# Patient Record
Sex: MALE | Race: WHITE | NOT HISPANIC OR LATINO | Employment: UNEMPLOYED | URBAN - METROPOLITAN AREA
[De-identification: names, ages, dates, MRNs, and addresses within clinical notes are randomized per-mention and may not be internally consistent; named-entity substitution may affect disease eponyms.]

---
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
## 2020-11-25 ENCOUNTER — OFFICE VISIT (OUTPATIENT)
Dept: URGENT CARE | Facility: CLINIC | Age: 20
End: 2020-11-25
Payer: COMMERCIAL

## 2020-11-25 VITALS
TEMPERATURE: 98 F | HEART RATE: 81 BPM | RESPIRATION RATE: 16 BRPM | BODY MASS INDEX: 17.07 KG/M2 | DIASTOLIC BLOOD PRESSURE: 75 MMHG | HEIGHT: 72 IN | OXYGEN SATURATION: 100 % | SYSTOLIC BLOOD PRESSURE: 114 MMHG | WEIGHT: 126 LBS

## 2020-11-25 DIAGNOSIS — R05.9 COUGH: Primary | ICD-10-CM

## 2020-11-25 DIAGNOSIS — Z20.822 EXPOSURE TO COVID-19 VIRUS: ICD-10-CM

## 2020-11-25 LAB
CTP QC/QA: YES
SARS-COV-2 RDRP RESP QL NAA+PROBE: NEGATIVE

## 2020-11-25 PROCEDURE — 99203 OFFICE O/P NEW LOW 30 MIN: CPT | Mod: S$GLB,,, | Performed by: PHYSICIAN ASSISTANT

## 2020-11-25 PROCEDURE — 99203 PR OFFICE/OUTPT VISIT, NEW, LEVL III, 30-44 MIN: ICD-10-PCS | Mod: S$GLB,,, | Performed by: PHYSICIAN ASSISTANT

## 2020-11-25 PROCEDURE — U0002 COVID-19 LAB TEST NON-CDC: HCPCS | Mod: QW,S$GLB,, | Performed by: PHYSICIAN ASSISTANT

## 2020-11-25 PROCEDURE — 3008F PR BODY MASS INDEX (BMI) DOCUMENTED: ICD-10-PCS | Mod: CPTII,S$GLB,, | Performed by: PHYSICIAN ASSISTANT

## 2020-11-25 PROCEDURE — 3008F BODY MASS INDEX DOCD: CPT | Mod: CPTII,S$GLB,, | Performed by: PHYSICIAN ASSISTANT

## 2020-11-25 PROCEDURE — U0002: ICD-10-PCS | Mod: QW,S$GLB,, | Performed by: PHYSICIAN ASSISTANT

## 2020-11-25 RX ORDER — PROMETHAZINE HYDROCHLORIDE AND DEXTROMETHORPHAN HYDROBROMIDE 6.25; 15 MG/5ML; MG/5ML
5 SYRUP ORAL
Qty: 118 ML | Refills: 0 | Status: SHIPPED | OUTPATIENT
Start: 2020-11-25 | End: 2020-12-02

## 2020-11-25 RX ORDER — BENZONATATE 100 MG/1
100 CAPSULE ORAL EVERY 6 HOURS PRN
Qty: 28 CAPSULE | Refills: 0 | Status: SHIPPED | OUTPATIENT
Start: 2020-11-25 | End: 2020-12-02

## 2020-11-25 NOTE — PROGRESS NOTES
Subjective:       Patient ID: Riaz Bernard is a 20 y.o. male.    Vitals:  height is 6' (1.829 m) and weight is 57.2 kg (126 lb). His temperature is 97.7 °F (36.5 °C). His blood pressure is 114/75 and his pulse is 81. His respiration is 16 and oxygen saturation is 100%.     Chief Complaint: Cough    Pt presents to clinic with complaint of coughing. Pt states it started this morning. Pt has been exposed to his dad who tested positive for covid Saturday. Pt has not tried anything. Pt is requesting covid test    Cough  This is a new problem. The current episode started today. The problem has been unchanged. The problem occurs constantly. The cough is non-productive. Pertinent negatives include no chest pain, chills, fever, headaches, myalgias, rash, sore throat or shortness of breath. Nothing aggravates the symptoms. He has tried nothing for the symptoms. The treatment provided no relief.       Constitution: Negative for chills, fatigue and fever.   HENT: Negative for congestion and sore throat.    Neck: Negative for painful lymph nodes.   Cardiovascular: Negative for chest pain and leg swelling.   Eyes: Negative for double vision and blurred vision.   Respiratory: Positive for cough. Negative for shortness of breath.    Gastrointestinal: Negative for nausea, vomiting and diarrhea.   Genitourinary: Negative for dysuria, frequency and urgency.   Musculoskeletal: Negative for joint pain, joint swelling, muscle cramps and muscle ache.   Skin: Negative for color change, pale and rash.   Allergic/Immunologic: Negative for seasonal allergies.   Neurological: Negative for dizziness, history of vertigo, light-headedness, passing out and headaches.   Hematologic/Lymphatic: Negative for swollen lymph nodes, easy bruising/bleeding and history of blood clots. Does not bruise/bleed easily.   Psychiatric/Behavioral: Negative for nervous/anxious, sleep disturbance and depression. The patient is not nervous/anxious.         Objective:      Physical Exam   Constitutional: He is oriented to person, place, and time. He appears well-developed.  Non-toxic appearance. He does not appear ill. No distress.   HENT:   Head: Normocephalic and atraumatic.   Ears:   Right Ear: Hearing and external ear normal.   Left Ear: Hearing and external ear normal.   Eyes: Pupils are equal, round, and reactive to light. Conjunctivae and EOM are normal.   Neck: Normal range of motion. Neck supple. No neck rigidity.   Cardiovascular: Normal rate and regular rhythm.   Pulmonary/Chest: Effort normal and breath sounds normal. No respiratory distress.   Abdominal:      Comments: Normal appearance of abdomen   Musculoskeletal: Normal range of motion.   Neurological: He is alert and oriented to person, place, and time.   Skin: Skin is warm, dry and not diaphoretic. Psychiatric: His speech is normal and behavior is normal. Mood normal.   Nursing note and vitals reviewed.    Office Visit on 11/25/2020   Component Date Value Ref Range Status    POC Rapid COVID 11/25/2020 Negative  Negative Final     Acceptable 11/25/2020 Yes   Final           Assessment:       1. Cough    2. Exposure to COVID-19 virus        Plan:       All hx was provided by the pt or available as part of established EMR. The pt past medical hx, family hx, social hx, and current medications were reviewed. Interpretation of diagnostics performed today were discussed. Tx discussed. Quarantine recommendations based on CDC guidelines discussed. Pt to follow up with [facility] or PCP if no improvement or for any concern. Seek treatment in an ER for worsening. Pt voiced understanding of all discussed, and agreed.    Cough  -     POCT COVID-19 Rapid Screening  -     benzonatate (TESSALON PERLES) 100 MG capsule; Take 1 capsule (100 mg total) by mouth every 6 (six) hours as needed for Cough.  Dispense: 28 capsule; Refill: 0  -     promethazine-dextromethorphan (PROMETHAZINE-DM) 6.25-15 mg/5 mL Syrp;  Take 5 mLs by mouth every 4 to 6 hours as needed.  Dispense: 118 mL; Refill: 0    Exposure to COVID-19 virus      Patient Instructions   · Follow up with your primary care if symptoms do not improve, or you may return here at any time.  · If you were referred to a specialist, please follow up with that specialty.  · If you were prescribed antibiotics, please take them to completion.  · If you were prescribed a narcotic or any medication with sedative effects, do not drive or operate heavy equipment or machinery while taking these medications.  · You must understand that you have received treatment at an Urgent Care facility only, and that you may be released before all of your medical problems are known or treated. Urgent Care facilities are not equipped to handle life threatening emergencies. It is recommended that you seek care at an Emergency Department for further evaluation of worsening or concerning symptoms, or possibly life threatening conditions as discussed.                                        If you  smoke, please stop smoking               PLEASE PRACTICE SOCIAL DISTANCING      COVID rapid testing was NEGATIVE. However based on exposure to known COVID positive individual, and current symptoms, you should quarantine for 10 days from the onset of symptoms. You should be 24 hours symptoms improved/fever free before quarantine end.     You should follow CDC guidelines as well as your employer/school protocols for safely returning to work/school. Please be aware that there are False Negative possibilities with testing and you should return to work based upon CDC guidelines, not simply a negative result, unless your employer/school has a different RTW protocol/guidance for you. If you are able to return to work, you should still quarantine outside of work based on CDC guidance as we discussed.     Please refer to CDC website for additional information -  https://www.cdc.gov/coronavirus/2019-ncov/if-you-are-sick/index.html.      Over the counter and home treatment of symptoms:  Alternate tylenol and motrin every 3 hours  Salt water gargles  Cold-eeze helps to reduce the duration of sore throat symptoms  Cepachol helps to numb the discomfort  Chloroseptic spray  Nasal saline spray reduces inflammation and dryness  Warm face compresses as often as you can  Vicks vapor rub at night  Flonase OTC or Nasacort OTC  Simple foods like chicken noodle soup help  Pedialyte helps with dehydration if lacking appetite  Rest as much as you can

## 2020-11-25 NOTE — PATIENT INSTRUCTIONS
· Follow up with your primary care if symptoms do not improve, or you may return here at any time.  · If you were referred to a specialist, please follow up with that specialty.  · If you were prescribed antibiotics, please take them to completion.  · If you were prescribed a narcotic or any medication with sedative effects, do not drive or operate heavy equipment or machinery while taking these medications.  · You must understand that you have received treatment at an Urgent Care facility only, and that you may be released before all of your medical problems are known or treated. Urgent Care facilities are not equipped to handle life threatening emergencies. It is recommended that you seek care at an Emergency Department for further evaluation of worsening or concerning symptoms, or possibly life threatening conditions as discussed.                                        If you  smoke, please stop smoking               PLEASE PRACTICE SOCIAL DISTANCING      COVID rapid testing was NEGATIVE. However based on exposure to known COVID positive individual, and current symptoms, you should quarantine for 10 days from the onset of symptoms. You should be 24 hours symptoms improved/fever free before quarantine end.     You should follow CDC guidelines as well as your employer/school protocols for safely returning to work/school. Please be aware that there are False Negative possibilities with testing and you should return to work based upon CDC guidelines, not simply a negative result, unless your employer/school has a different RTW protocol/guidance for you. If you are able to return to work, you should still quarantine outside of work based on CDC guidance as we discussed.     Please refer to CDC website for additional information - https://www.cdc.gov/coronavirus/2019-ncov/if-you-are-sick/index.html.      Over the counter and home treatment of symptoms:  Alternate tylenol and motrin every 3 hours  Salt water  gargles  Cold-eeze helps to reduce the duration of sore throat symptoms  Cepachol helps to numb the discomfort  Chloroseptic spray  Nasal saline spray reduces inflammation and dryness  Warm face compresses as often as you can  Vicks vapor rub at night  Flonase OTC or Nasacort OTC  Simple foods like chicken noodle soup help  Pedialyte helps with dehydration if lacking appetite  Rest as much as you can